# Patient Record
Sex: MALE | Race: WHITE | Employment: FULL TIME | URBAN - NONMETROPOLITAN AREA
[De-identification: names, ages, dates, MRNs, and addresses within clinical notes are randomized per-mention and may not be internally consistent; named-entity substitution may affect disease eponyms.]

---

## 2018-12-17 ENCOUNTER — APPOINTMENT (OUTPATIENT)
Dept: GENERAL RADIOLOGY | Age: 28
End: 2018-12-17
Payer: OTHER MISCELLANEOUS

## 2018-12-17 ENCOUNTER — HOSPITAL ENCOUNTER (EMERGENCY)
Age: 28
Discharge: HOME | End: 2018-12-17
Attending: EMERGENCY MEDICINE
Payer: OTHER MISCELLANEOUS

## 2018-12-17 VITALS
RESPIRATION RATE: 18 BRPM | HEART RATE: 93 BPM | OXYGEN SATURATION: 100 % | BODY MASS INDEX: 20.76 KG/M2 | WEIGHT: 145 LBS | HEIGHT: 70 IN | SYSTOLIC BLOOD PRESSURE: 140 MMHG | TEMPERATURE: 97.9 F | DIASTOLIC BLOOD PRESSURE: 77 MMHG

## 2018-12-17 DIAGNOSIS — S80.11XA CONTUSION OF MULTIPLE SITES OF RIGHT LOWER EXTREMITY, INITIAL ENCOUNTER: ICD-10-CM

## 2018-12-17 DIAGNOSIS — S01.21XA LACERATION OF NOSE, INITIAL ENCOUNTER: ICD-10-CM

## 2018-12-17 DIAGNOSIS — S00.83XA CONTUSION OF FACE, INITIAL ENCOUNTER: ICD-10-CM
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
## 2018-12-17 PROCEDURE — 99284 EMERGENCY DEPT VISIT MOD MDM: CPT

## 2018-12-17 PROCEDURE — 90471 IMMUNIZATION ADMIN: CPT | Performed by: EMERGENCY MEDICINE

## 2018-12-17 PROCEDURE — 6360000002 HC RX W HCPCS: Performed by: EMERGENCY MEDICINE

## 2018-12-17 PROCEDURE — 71046 X-RAY EXAM CHEST 2 VIEWS: CPT

## 2018-12-17 PROCEDURE — 90714 TD VACC NO PRESV 7 YRS+ IM: CPT | Performed by: EMERGENCY MEDICINE

## 2018-12-17 RX ORDER — TETANUS AND DIPHTHERIA TOXOIDS ADSORBED 2; 2 [LF]/.5ML; [LF]/.5ML
0.5 INJECTION INTRAMUSCULAR ONCE
Status: COMPLETED | OUTPATIENT
Start: 2018-12-17 | End: 2018-12-17

## 2018-12-17 RX ADMIN — TETANUS AND DIPHTHERIA TOXOIDS ADSORBED 0.5 ML: 2; 2 INJECTION INTRAMUSCULAR at 08:51

## 2018-12-17 ASSESSMENT — PAIN DESCRIPTION - LOCATION: LOCATION: FACE

## 2018-12-17 ASSESSMENT — ENCOUNTER SYMPTOMS
BACK PAIN: 0
GASTROINTESTINAL NEGATIVE: 1
RESPIRATORY NEGATIVE: 1

## 2018-12-17 ASSESSMENT — PAIN SCALES - GENERAL: PAINLEVEL_OUTOF10: 7

## 2018-12-17 ASSESSMENT — PAIN DESCRIPTION - PAIN TYPE: TYPE: ACUTE PAIN

## 2018-12-17 ASSESSMENT — PAIN DESCRIPTION - DESCRIPTORS: DESCRIPTORS: ACHING

## 2018-12-17 ASSESSMENT — PAIN DESCRIPTION - FREQUENCY: FREQUENCY: CONTINUOUS
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
## 2018-12-17 NOTE — ED NOTES
Discharge instructions were reviewed and the patient was encouraged to follow up with Td Olvera. He was also encouraged to take tylenol or Ibuprofen for discomfort.       Nina Kidd RN  12/17/18 [id number]

## 2020-12-29 ENCOUNTER — HOSPITAL ENCOUNTER (OUTPATIENT)
Dept: PSYCHIATRY | Age: 30
Setting detail: THERAPIES SERIES
Discharge: HOME | End: 2020-12-29

## 2020-12-29 PROCEDURE — 90791 PSYCH DIAGNOSTIC EVALUATION: CPT

## 2020-12-29 PROCEDURE — 80305 DRUG TEST PRSMV DIR OPT OBS: CPT

## 2020-12-29 ASSESSMENT — LIFESTYLE VARIABLES: HISTORY_ALCOHOL_USE: NO

## 2020-12-29 NOTE — PROGRESS NOTES
[address] [place] [address] Urinalysis Laboratory Testing and Medical History      Location: [] [place] [x] Earline Mak MD., [id number], Medical Director of [facility] Director orders for [address] [place] [address] clinical therapists to collect an urine sample from:    Client: Beti Martinez   : 1990   Case# [id number]    Urine sample will be collected following the collections guidelines provided on Clinical Reference Laboratory [facility] [place]) custody form, and completion of the [facility] Non-Federal chain of custody drug screening form. During the course of treatment, randomly a urine sample will be collected, at a minimum of one time a month, more frequently as needed, as part of the clinical outpatient alcohol and drug treatment program at [address] [place] [address]. Medical care recommendation for clients experiencing/reporting  medical concerns, who do not have a family physician and willing to attend  medical care will be assisted in seeking medical care. Clinical providers will refer clients to local family physicians practices as part of the  clients treatment plan and assist the client in gaining access to an appointment. Release of information will be requested to support the  clients seeking medical care. Summary of Medical History  Prior to Admission medications    Not on File     Past Surgical History:   Procedure Laterality Date    FRACTURE SURGERY       History reviewed. No pertinent past medical history. There are no active problems to display for this patient.       Jina Tillman [facility]  2020/11:02 AM
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
## 2020-12-29 NOTE — PROGRESS NOTES
[facility]                Progress Note    [] [place]  [] Vicki mireles                    Patient Name: Rasta Ruano   : 1990     Case # :  Jocelyn Schmitt  Therapist: Tom Shell        Objective/Service/Time:    K1.0 UDS . 35 Asmt. S- Client early for his [de-identified], Client referral from Randy Allison. Client reported legal and use history. Client has 2 boys ages 3 and 5 special medical issues. O- Client is focused, cooperative, oriented x 3, No S/I, No H/I, No MH  A- Completed the Asmt, the screen and TRUMAN  P- Treatment Planning next week.                   Tom Shell MA, BEAR, LSW, MAC 20, 11:54 AM

## 2021-01-05 ENCOUNTER — HOSPITAL ENCOUNTER (OUTPATIENT)
Dept: PSYCHIATRY | Age: 31
Setting detail: THERAPIES SERIES
Discharge: HOME | End: 2021-01-05

## 2021-01-05 PROCEDURE — 90834 PSYTX W PT 45 MINUTES: CPT

## 2021-01-05 NOTE — PROGRESS NOTES
[facility] TREATMENT PLAN      Location: [] [place] [x] Vicki mireles    Treatment plan: Initial    Strengths: Dedication, enjoys work    Weakness/Limitations: Use of drugs, legal    Service/Frequency/Duration: 1GT, 1IT and random UDS all in 90 days    Diagnosis: F12.10 Nondependent cannabis abuse-unspecified use    Level of Care: 1 Outpatient Services    1. Problem: Use of drugs   a. Goal: Maintain sober living in 90 days  b. Objectives:   i. 1) Complete RP treatment handouts on Cannabis Abuse in 90 days Evaluation Date: 4/05/21 Code: C Continue TBD  ii. 2) Id 2 triggers of use in 90 days  Evaluation Date: 4/05/21 Code: C Continue TBD      2. Problem: Stressors that lead to use  a. Goal: Coping plan for family and overall stress in 90 days  b. Objectives:   i. 1) Learn 7 habits of highly effective people in 90 days Evaluation Date:4/05/21 Code: C Continue TBD   ii. 2) Find supports such as, [address] or peer activity in 90 days Evaluation Date: 4/05/21 Code: C Continue TBD       3. Problem: Legal   a. Goal: Meet expectations of [employer] in 90 days  b. Objectives:   i. 1) Attend all meetings and pay sanctions in 90 daysEvaluation Date: 4/05/21 Code: C Continue TBD       Defer: Exploring careers    Discharge Plan/Instructions: Client plans to maintain sober living and support his growing family with a stable position. Brody Sheffield / 1990 has participated in the treatment plan development outlined above on 1/5/2021.      Deangelo Blanco  1/5/2021/9:14 AM

## 2021-01-05 NOTE — PROGRESS NOTES
[facility]                Progress Note    [] [place]  [x] Marce Martinez                    Patient Name: Elías Hernandez   : 1990     Case # : [id number]  Therapist: Forest Koehler        Objective/Service/Time:    Ind Counseling, K1.0,  Treatment Planning  S- Client was on time, focused. Client supporting his son's and helping with home schooling. Laid off from asphalt job. O- Focused, awareness and motivated  A- Discussion on treatment planning and discussed UDS. Client and this writer discussed his addiction to cig's and his social use of cannabis to calm his nerves. P- IT<GT,  Woodworking, taking care of his children.                   Forest Koehler MA, BEAR, LSW, MAC 21, 11:56 AM

## 2021-01-12 ENCOUNTER — HOSPITAL ENCOUNTER (OUTPATIENT)
Dept: PSYCHIATRY | Age: 31
Setting detail: THERAPIES SERIES
Discharge: HOME | End: 2021-01-12

## 2021-01-12 NOTE — PROGRESS NOTES
[facility]                Progress Note    [] [place]  [x] Catarina Castillo                    Patient Name: Leni Bethea   : 1990     Case # : [id number]  Therapist: Krystal Curry        Objective/Service/Time:    Case Management, Client was offered Telehealth due to his cancellation. Client stated he could not because he had some things going on. Client agreed to attend group on Thursday.                 Krystal Curry, MA, BEAR, LSW, MAC 21, 10:32 AM

## 2021-01-14 ENCOUNTER — HOSPITAL ENCOUNTER (OUTPATIENT)
Dept: PSYCHIATRY | Age: 31
Setting detail: THERAPIES SERIES
Discharge: HOME | End: 2021-01-14

## 2021-01-14 PROCEDURE — 90853 GROUP PSYCHOTHERAPY: CPT

## 2021-01-14 NOTE — GROUP NOTE
[facility] Group Therapy Note      1/14/2021    Location:  [employer]    Clients Presents: [phone]    Clients Absent:     Length of session: 1.5 hours    Group Note: OP    Group Type: Co-Ed    New members were welcomed and introduced. Norms and expectations of group were discussed. Content: Check in, Discussion on the phases of recovery and client's related to their recovery from drugs and alcohol. [facility]  1/14/2021 11:27 AM    Co-Therapist: N/A      [facility] Individual Group Progress Note    Paul Sinha  1990 1/14/2021    Notes on Client Progress in Group    Client's initial group, Client disclosed of use and his family stressors and blessings.      Reese Cooks, [facility]  1/14/2021 11:29 AM    Co-Therapist: N/A

## 2021-01-19 ENCOUNTER — HOSPITAL ENCOUNTER (OUTPATIENT)
Dept: PSYCHIATRY | Age: 31
Setting detail: THERAPIES SERIES
Discharge: HOME | End: 2021-01-19

## 2021-01-19 PROCEDURE — 90834 PSYTX W PT 45 MINUTES: CPT

## 2021-01-19 NOTE — PROGRESS NOTES
[facility]                Progress Note    [] [place]  [x] [facility]                    Patient Name: Kaci Kaminski   : 1990     Case # :  [id number]  Therapist: Melina Rodriguez        Objective/Service/Time:    K-    , Ind Counseling, Topic: Begin Treatment Booklet/Discussion goal   S- Client reports, doing ok, supporting children home school and home. Client waiting for spring begin work again  [employer]- client cooperative, sober, denies use or issue, concerned about the boys  A- Discussed pages of txt book 2-7, Roller coaster ride and consequences of cravings discussed.   P- GT<IT, [facility] and PO                Melina Rodriguez, [place], [place], [place], [facility] 21, 10:20 AM

## 2021-01-21 ENCOUNTER — HOSPITAL ENCOUNTER (OUTPATIENT)
Dept: PSYCHIATRY | Age: 31
Setting detail: THERAPIES SERIES
Discharge: HOME | End: 2021-01-21

## 2021-01-21 PROCEDURE — 90853 GROUP PSYCHOTHERAPY: CPT

## 2021-01-21 NOTE — GROUP NOTE
[facility] Group Therapy Note      1/21/2021    Location:  [employer]    Clients Presents: [phone]    Clients Absent:     Length of session: 1.5 hours    Group Note: OP    Group Type: Co-Ed    New members were welcomed and introduced. Norms and expectations of group were discussed. Content: Client's checked in; Client's discussed Protective coping factors, Clients discussed change/defense mechanism after watching DVD Who Moved My Cheese. Deangelo Khan  1/21/2021 12:25 PM    Co-Therapist: N/A      [facility] Individual Group Progress Note    Elivar  1990 1/21/2021    Notes on Client Progress in Group    Client denies use and responded to protective factors by sharing about his disabled son and how positive/blessed his family is.      Deangelo Khan  1/21/2021 12:27 PM    Co-Therapist: N/A

## 2021-01-26 ENCOUNTER — HOSPITAL ENCOUNTER (OUTPATIENT)
Dept: PSYCHIATRY | Age: 31
Setting detail: THERAPIES SERIES
Discharge: HOME | End: 2021-01-26

## 2021-01-26 PROCEDURE — 90834 PSYTX W PT 45 MINUTES: CPT

## 2021-01-26 PROCEDURE — 80305 DRUG TEST PRSMV DIR OPT OBS: CPT

## 2021-01-26 NOTE — PROGRESS NOTES
[facility]                Progress Note    [] [place]  [x] [facility]                    Patient Name: Kaci Kaminski   : 1990     Case # :  [id number]  Therapist: Melina Rodriguez        Objective/Service/Time:    K 1.0 UDS . 35 Topic: Impact of alcohol on your brain, liver and heart. S-Client continues to do well and is motivated by his children. Client working on his car and denies other challenges currently. Per his report  O- Client is focused, cooperative, full range- bright  A- Discussed of impact of alcohol on your brain, liver and heart. P-GRP, [facility], IT,  Homeschooling/family focused.                   Melina Rodriguez MA, LPC, LSW, MAC 21, 10:41 AM

## 2021-01-28 ENCOUNTER — HOSPITAL ENCOUNTER (OUTPATIENT)
Dept: PSYCHIATRY | Age: 31
Setting detail: THERAPIES SERIES
Discharge: HOME | End: 2021-01-28

## 2021-01-28 PROCEDURE — 90853 GROUP PSYCHOTHERAPY: CPT

## 2021-02-02 ENCOUNTER — HOSPITAL ENCOUNTER (OUTPATIENT)
Dept: PSYCHIATRY | Age: 31
Setting detail: THERAPIES SERIES
Discharge: HOME | End: 2021-02-02

## 2021-02-02 PROCEDURE — 90834 PSYTX W PT 45 MINUTES: CPT

## 2021-02-02 NOTE — PROGRESS NOTES
[facility]                Progress Note    [] [place]  [x] Vicki mireles                    Patient Name: Carmen Nazario   : 1990     Case # :  [id number]  Therapist: Awilda Garza        Objective/Service/Time:    K1.0 Ind Counseling,  Sober goal , discuss UDS Results  S-Client reports, drinks mountain dew and his diet could be better. Client denies use, client supporting family during his lay off, spouse working more as he takes care of their children. O- Discussed UDS, reports no use or major issues currently  A- Reviewed and discussed impact of meth, alcohol, marijuana and cocaine on ones Brain, heart, lungs and reproductive system. P- PO visit in 2 weeks, increase activity, increase water intake, apply information, look at healthy diet, GT on .                   Awilda Garza MA, LPC, LSW, MAC 21, 10:48 AM

## 2021-02-04 ENCOUNTER — APPOINTMENT (OUTPATIENT)
Dept: PSYCHIATRY | Age: 31
End: 2021-02-04

## 2021-02-09 ENCOUNTER — HOSPITAL ENCOUNTER (OUTPATIENT)
Dept: PSYCHIATRY | Age: 31
Setting detail: THERAPIES SERIES
Discharge: HOME | End: 2021-02-09

## 2021-02-09 PROCEDURE — 90834 PSYTX W PT 45 MINUTES: CPT

## 2021-02-09 NOTE — PROGRESS NOTES
612 [facility]                Progress Note    [] [place]  [x] Steve Sandoval                    Patient Name: Celso Ch   : 1990     Case # : [id number]  Therapist: Joel Mensah        Objective/Service/Time:    K1.0    Ind Counseling, Topic   S- Client reports, anxious to be called back to work. Client's family being blessed with a vacation to [facility] in July due to his son's special needs. Client reports his son is excited, and client fixed his car. O- Client full range, cooperative and denies current use  A- Client and this writer discussed impact of using Narcotics,  Inhalants, and client cited examples of people he knows who has abused drugs. Client discussed financial stressors and is working with financial aid for treatment cost.  P- GT and IT, working with medical providers for his sons.                   Joel Mensah MA, LPC, LSW, MAC 21, 10:50 AM

## 2021-02-11 ENCOUNTER — HOSPITAL ENCOUNTER (OUTPATIENT)
Dept: PSYCHIATRY | Age: 31
Setting detail: THERAPIES SERIES
Discharge: HOME | End: 2021-02-11

## 2021-02-11 PROCEDURE — 90853 GROUP PSYCHOTHERAPY: CPT

## 2021-02-11 NOTE — GROUP NOTE
[facility] Group Therapy Note      2/11/2021    Location:  Allyson Mac    Clients YBPRBHAC:[phone]     Clients Absent: [phone]    Length of session: 1.5 hours    Group Note: OP    Group Type: Co-Ed    New members were welcomed and introduced. Norms and expectations of group were discussed. Content: Client's discussed spirituality and Father Emir Siegel Step 1 on Powerlessness. Client affirmed a client who was completing GT. Melina Rodriguez [facility]  2/11/2021 11:56 AM    Co-Therapist: N/A      [facility] Individual Group Progress Note    Kaci Kaminski  1990 2/11/2021    Notes on Client Progress in Group    Client gave feedback to his peer, and responded to the Father Emir Siegel tape. Client shared a lot about his special needs son who is a blessing to him. Denies use.     Melina Rodriguez [facility]  2/11/2021 11:59 AM    Co-Therapist: N/A

## 2021-02-18 ENCOUNTER — HOSPITAL ENCOUNTER (OUTPATIENT)
Dept: PSYCHIATRY | Age: 31
Setting detail: THERAPIES SERIES
Discharge: HOME | End: 2021-02-18

## 2021-02-18 PROCEDURE — 90834 PSYTX W PT 45 MINUTES: CPT

## 2021-02-18 NOTE — PROGRESS NOTES
[facility]                Progress Note    [] [place]  [x] Vicki mireles                    Patient Name: Agusto Saavedra   : 1990     Case # :  [id number]  Therapist: Rhett Escalante        Objective/Service/Time:    Ind Counseling K1.0,  Topic:  impact of LSD, Prescription drugs and synthetics  S- Client came into [facility], only attender so agreed to IT. 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 Client enjoys working with his hands. Client met with his PO 3/1  O- focused, cooperative, engaged  A- Discussed his past use and his non use stress reduction, discussed treatment booklet impact of drugs on users. P- IT, non use stress reduction, developing positive habits, GT.                   Rhett Escalante MA, BEAR, LSW, MAC 21, 11:18 AM

## 2021-02-23 ENCOUNTER — HOSPITAL ENCOUNTER (OUTPATIENT)
Dept: PSYCHIATRY | Age: 31
Setting detail: THERAPIES SERIES
Discharge: HOME | End: 2021-02-23

## 2021-02-23 PROCEDURE — 90834 PSYTX W PT 45 MINUTES: CPT

## 2021-02-23 PROCEDURE — 80305 DRUG TEST PRSMV DIR OPT OBS: CPT

## 2021-02-25 ENCOUNTER — APPOINTMENT (OUTPATIENT)
Dept: PSYCHIATRY | Age: 31
End: 2021-02-25

## 2021-03-02 ENCOUNTER — HOSPITAL ENCOUNTER (OUTPATIENT)
Dept: PSYCHIATRY | Age: 31
Setting detail: THERAPIES SERIES
Discharge: HOME | End: 2021-03-02

## 2021-03-02 PROCEDURE — 90834 PSYTX W PT 45 MINUTES: CPT

## 2021-03-02 NOTE — PROGRESS NOTES
[facility] TREATMENT PLAN      Location: [] [place] [x] Vicki mireles    Treatment plan: Initial    Strengths: Dedication, enjoys work    Weakness/Limitations: Use of drugs, legal    Service/Frequency/Duration: 1GT, 1IT and random UDS all in 90 days    Diagnosis: F12.10 Nondependent cannabis abuse-unspecified use    Level of Care: 1 Outpatient Services    1. Problem: Use of drugs   a. Goal: Maintain sober living in 90 days  b. Objectives:   i. 1) Complete RP treatment handouts on Cannabis Abuse in 90 days Evaluation Date: 4/05/21 Code: C Continue TBD 2/23/21  ii. 2) Id 2 triggers of use in 90 days  Evaluation Date: 4/05/21 Code: C Continue TBD 2/23/21      2. Problem: Stressors that lead to use  a. Goal: Coping plan for family and overall stress in 90 days  b. Objectives:   i. 1) Learn 7 habits of highly effective people in 90 days Evaluation Date:4/05/21 Code: C Continue TBD 3/2/21  ii. 2) Find supports such as, [address] or peer activity in 90 days Evaluation Date: 4/05/21 Code: C Continue TBD       3. Problem: Legal   a. Goal: Meet expectations of [employer] in 90 days  b. Objectives:   i. 1) Attend all meetings and pay sanctions in 90 daysEvaluation Date: 4/05/21 Code: C Continue TBD monthly meetings 3/2/21       Defer: Exploring careers    Discharge Plan/Instructions: Client plans to maintain sober living and support his growing family with a stable position. Susanne Joseph / 1990 has participated in the treatment plan development outlined above on 3/2/2021.      Michael Real [facility]  3/2/2021/10:49 AM

## 2021-03-02 NOTE — PROGRESS NOTES
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 Client continues to support his children and family. O- Client denies use, signed his UDS, cooperative, shared freely. A- Client and this writer discussed treatment goals and focused on 7 Habits of Highly effective Person. Client shared and related to the habits. Client assessment of self he needs to work on being proactive and not procrastinate. P- GT, apply skills, completed treatment by the end of the month.                   Wing Mccallum MA, BEAR, LSW, [facility] 21, 11:52 AM

## 2021-03-02 NOTE — PROGRESS NOTES
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 O- Client denies use, signed his UDS, cooperative, shared freely. A- Client and this writer discussed treatment goals and focused on 7 Habits of Highly effective Person. Client shared and related to the habits. Client assessment of self he needs to work on being proactive and not procrastinate. P- GT, apply skills, completed treatment by the end of the month.                   Emmanuel Null MA, BEAR, LSW, MAC 21, 1:21 PM

## 2021-03-04 ENCOUNTER — HOSPITAL ENCOUNTER (OUTPATIENT)
Dept: PSYCHIATRY | Age: 31
Setting detail: THERAPIES SERIES
Discharge: HOME | End: 2021-03-04

## 2021-03-04 PROCEDURE — 90853 GROUP PSYCHOTHERAPY: CPT

## 2021-03-04 NOTE — GROUP NOTE
[address] [facility] Group Therapy Note      3/4/2021    Location:  [employer]    Clients Presents: [phone]    Clients Absent:     Length of session: 1.5 hours    Group Note: OP    Group Type: Co-Ed    New members were welcomed and introduced. Norms and expectations of group were discussed. Content: Client's discussed benefits/consequences of their addiction, the losses of their addiction and voids/positive alternatives of their addiction. Discussed Rosalio Aguilar addiction story. Melina Rodriguez [facility]  3/4/2021 11:29 AM    Co-Therapist: N/A      [facility] Individual Group Progress Note    Kaci Kaminski  1990  3/4/2021    Notes on Client Progress in Group    Client could relate to Cesar Bassett, and stated he is anti pain meds, talk freely about Return on invested.  Client is focused and motivated     Melina Rodriguez [facility]  3/4/2021 11:34 AM    Co-Therapist: N/A

## 2021-03-09 ENCOUNTER — APPOINTMENT (OUTPATIENT)
Dept: PSYCHIATRY | Age: 31
End: 2021-03-09

## 2021-03-10 ENCOUNTER — HOSPITAL ENCOUNTER (OUTPATIENT)
Dept: PSYCHIATRY | Age: 31
Setting detail: THERAPIES SERIES
Discharge: HOME | End: 2021-03-10

## 2021-03-10 PROCEDURE — 90834 PSYTX W PT 45 MINUTES: CPT

## 2021-03-10 ASSESSMENT — ANXIETY QUESTIONNAIRES
5. BEING SO RESTLESS THAT IT IS HARD TO SIT STILL: 1-SEVERAL DAYS
GAD7 TOTAL SCORE: 1
6. BECOMING EASILY ANNOYED OR IRRITABLE: 0-NOT AT ALL
3. WORRYING TOO MUCH ABOUT DIFFERENT THINGS: 0-NOT AT ALL
7. FEELING AFRAID AS IF SOMETHING AWFUL MIGHT HAPPEN: 0-NOT AT ALL

## 2021-03-10 NOTE — PROGRESS NOTES
[facility] TREATMENT PLAN      Location: [] [place] [x] Vicki mireles    Treatment plan: Initial    Strengths: Dedication, enjoys work    Weakness/Limitations: Use of drugs, legal    Service/Frequency/Duration: 1GT, 1IT and random UDS all in 90 days    Diagnosis: F12.10 Nondependent cannabis abuse-unspecified use    Level of Care: 1 Outpatient Services    1. Problem: Use of drugs   a. Goal: Maintain sober living in 90 days  b. Objectives:   i. 1) Complete RP treatment handouts on Cannabis Abuse in 90 days Evaluation Date: 4/05/21 Code: C Continue TBD 2/23/21completed Reviewed 3/10/21  ii. 2) Id 2 triggers of use in 90 days  Evaluation Date: 4/05/21 Code: C Continue TBD 2/23/21 completed Reviewed 3/10/21      2. Problem: Stressors that lead to use  a. Goal: Coping plan for family and overall stress in 90 days  b. Objectives:   i. 1) Learn 7 habits of highly effective people in 90 days Evaluation Date:4/05/21 Code: C Continue TBD 3/2/21 Completed Reviewed 3/10/21  ii. 2) Find supports such as, [address] or peer activity in 90 days Evaluation Date: 4/05/21 Code: C Continue TBD  Reviewed 3/10/21      3. Problem: Legal   a. Goal: Meet expectations of [facility]. [address] in 90 days  b. Objectives:   i. 1) Attend all meetings and pay sanctions in 90 daysEvaluation Date: 4/05/21 Code: C Continue TBD monthly meetings 3/2/21, 3/15/21 next meeting       Defer: Exploring careers    Discharge Plan/Instructions: Client plans to maintain sober living and support his growing family with a stable position. Charlene Herr / 1990 has participated in the treatment plan development outlined above on 3/10/2021.      Emmanuel Null LPC  3/10/2021/10:43 AM

## 2021-03-10 NOTE — PROGRESS NOTES
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 Client denies any issues with mood, motivation use or anxiety. O- Dedicated to his family, full range, Client views self as logical, and enjoys affirmation of words  A- Client and this writer reviewed the goals, and Assessment tentative DC . Client denies use, admits ready to work outside of the house. Client and this writer discussed 5 Languages of Love and Temperament from [place], helps coping with relationships. P- PO visit next week, apply skills and coping.                   Marco Montano MA, BEAR, LSW, MAC 03/10/21, 11:43 AM

## 2021-03-11 ENCOUNTER — HOSPITAL ENCOUNTER (OUTPATIENT)
Dept: PSYCHIATRY | Age: 31
Setting detail: THERAPIES SERIES
Discharge: HOME | End: 2021-03-11

## 2021-03-11 PROCEDURE — 90853 GROUP PSYCHOTHERAPY: CPT

## 2021-03-11 NOTE — GROUP NOTE
[address] [facility] Group Therapy Note      3/11/2021    Location:  [facility]    Clients Presents: [phone]    Clients Absent: [id number]    Length of session: 1.5 hours    Group Note: OP    Group Type: Co-Ed    New members were welcomed and introduced. Norms and expectations of group were discussed. Content: Client's discussed Relapse maintenance and focusing on the positives of sober living, discussed NABILA Tierney's [place] of Influence and Addiction discussion Q/A. Rafa Wellington [facility] [place]  3/11/2021 11:29 AM    Co-Therapist: N/A      [facility] Individual Group Progress Note    Nieves Emile  1990  3/11/2021    Notes on Client Progress in Group    Client complete GRAFF Motivation Ruler, discussed his passions and how he generally focuses on solutions and not concerns. Client shares his disabled son has been a role model to him. Client has a passion for building and it is a motivational factor.      Rafa Wellington [facility] [place]  3/11/2021 11:34 AM    Co-Therapist: N/A

## 2021-03-16 ENCOUNTER — HOSPITAL ENCOUNTER (OUTPATIENT)
Dept: PSYCHIATRY | Age: 31
Setting detail: THERAPIES SERIES
Discharge: HOME | End: 2021-03-16

## 2021-03-16 PROCEDURE — 80305 DRUG TEST PRSMV DIR OPT OBS: CPT

## 2021-03-16 PROCEDURE — 90834 PSYTX W PT 45 MINUTES: CPT

## 2021-03-18 ENCOUNTER — HOSPITAL ENCOUNTER (OUTPATIENT)
Dept: PSYCHIATRY | Age: 31
Setting detail: THERAPIES SERIES
Discharge: HOME | End: 2021-03-18

## 2021-03-18 PROCEDURE — 90853 GROUP PSYCHOTHERAPY: CPT

## 2021-03-18 NOTE — GROUP NOTE
612 [facility] Group Therapy Note      3/18/2021    Location:  [facility]    Clients Presents: [phone]    Clients Absent:     Length of session: 1.5 hours    Group Note: OP    Group Type: Co-Ed    New members were welcomed and introduced. Norms and expectations of group were discussed. Content: Client's discussed their Relapse Maintenance, Viewing and discussing a sober story on Neal Dailey Drinking impact on Neurotransmitters, and Reading and evaluating [facility] AT [place] in the [employer]"    [facility] [place] [place]  3/18/2021 12:26 PM    Co-Therapist: N/A      [facility] Individual Group Progress Note    Karoline Appiah  1990  3/18/2021    Notes on Client Progress in Group    Client relayed sober living and how he used marijuana to change his GURPREET (anti anxiety) chemical, now he uses activities with his sons such as building [employer].     [facility] [place]  3/18/2021 12:31 PM    Co-Therapist: N/A

## 2021-03-23 ENCOUNTER — HOSPITAL ENCOUNTER (OUTPATIENT)
Dept: PSYCHIATRY | Age: 31
Setting detail: THERAPIES SERIES
Discharge: HOME | End: 2021-03-23

## 2021-03-23 PROCEDURE — 90834 PSYTX W PT 45 MINUTES: CPT

## 2021-03-23 NOTE — PROGRESS NOTES
612 [facility]                Progress Note    [] Cristian  [x] Vicki mireles                    Patient Name: Alma Rosa Kelsey   : 1990     Case # :  [id number]  Therapist: Mackenzie Espinosa        Objective/Service/Time:    K1.0 Ind Counseling, Topic:  Coping with stressors, planning for vacations  S-Client completed Elta Shaquille, discussed his vacation, and discussed his plan to return to work. Client continues to support his family and son's medical issues. O- Client and this writer reviewed UDS and discussed DC plan next week, denies use or any challenges  A- Client focuses on the positives and is enabled by his oldest son. Client and this writer discussed [facility] with his boys, and PT work for his Step Mom. Client has 1 GT and IT remaining.                 Mackenzie Espinosa MA, BEAR, LSW, MAC 21, 11:00 AM

## 2021-03-23 NOTE — PROGRESS NOTES
[facility] TREATMENT PLAN      Location: [] [place] [x] Vicki mireles    Treatment plan: Initial    Strengths: Dedication, enjoys work    Weakness/Limitations: Use of drugs, legal    Service/Frequency/Duration: 1GT, 1IT and random UDS all in 90 days    Diagnosis: F12.10 Nondependent cannabis abuse-unspecified use    Level of Care: 1 Outpatient Services    1. Problem: Use of drugs   a. Goal: Maintain sober living in 90 days  b. Objectives:   i. 1) Complete RP treatment handouts on Cannabis Abuse in 90 days Evaluation Date: 4/05/21 Code: C Continue TBD 2/23/21completed Reviewed 3/10/21 Achieved, 3/23/21  ii. 2) Id 2 triggers of use in 90 days  Evaluation Date: 4/05/21 Code: C Continue TBD 2/23/21 completed Reviewed 3/10/21, Achieved, 3/23/21      2. Problem: Stressors that lead to use  a. Goal: Coping plan for family and overall stress in 90 days  b. Objectives:   i. 1) Learn 7 habits of highly effective people in 90 days Evaluation Date:4/05/21 Code: C Continue TBD 3/2/21 Completed Reviewed 3/10/21 Achieved 3/23/21  ii. 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 Problem: Legal   a. Goal: Meet expectations of [facility] in 90 days  b. Objectives:   i. 1) Attend all meetings and pay sanctions in 90 daysEvaluation Date: 4/05/21 Code: C Continue TBD monthly meetings 3/2/21, 3/15/21 next meeting Achieved 3/23/21       Defer: Exploring careers    Discharge Plan/Instructions: Client plans to maintain sober living and support his growing family with a stable position. Carmen Nazario / 1990 has participated in the treatment plan development outlined above on 3/23/2021.      Awilda Garza [facility]  3/23/2021/1:36 PM

## 2021-03-25 ENCOUNTER — HOSPITAL ENCOUNTER (OUTPATIENT)
Dept: PSYCHIATRY | Age: 31
Setting detail: THERAPIES SERIES
Discharge: HOME | End: 2021-03-25

## 2021-03-25 PROCEDURE — 90853 GROUP PSYCHOTHERAPY: CPT

## 2021-03-25 NOTE — GROUP NOTE
612 [facility] Group Therapy Note      3/25/2021    Location:  [facility]    Clients Presents: [phone]     Clients Absent: [phone]    Length of session: 1.5 hours    Group Note: OP    Group Type: Co-Ed    New members were welcomed and introduced. Norms and expectations of group were discussed. Content: Client's discussed relapse maintenance and motivation, discussed Maureen Barbour, and utilize the [facility] of yourself. [facility] [place], [place]  3/25/2021 11:42 AM    Co-Therapist: N/A      [facility] Group Progress Note    Andie Todd  1990  3/25/2021    Notes on Client Progress in Group    Client has eye irritation. Client is excited to complete GRP and treatment & accepted feedback. Client responded to the video and to the STOPP handout. Client denies use and is going on a family vacation.     [facility] [place], [place]  3/25/2021 11:46 AM    Co-Therapist: N/A

## 2021-03-30 ENCOUNTER — HOSPITAL ENCOUNTER (OUTPATIENT)
Dept: PSYCHIATRY | Age: 31
Setting detail: THERAPIES SERIES
Discharge: HOME | End: 2021-03-30

## 2021-03-30 PROCEDURE — 90834 PSYTX W PT 45 MINUTES: CPT

## 2021-03-30 NOTE — PROGRESS NOTES
612 [facility] Discharge Treatment Plan    Thuan Armenta  1990  Case # [id number]    Location: [] [place] [x] Vicki mireles    A. Stresses that I need to monitor  1. family  2. relationship  3. work      B. Major triggers to using alcohol/drugs   1. Stress        Sober Plan   1. Work  2. Apply tools that your learned  3. Family activities    C. Sobriety Support   1. Friend:Dilip   2. Treatment staff: [address] [place] [address]   3. Family member: [facility], Mother,  Tiffanie Dobbins  4. AA/NA recovery program, sponsor, meetings day/times, daily ready: [employer]    D. Non-using activities  1. Building legos  2. Excercise  3. Wilma Slight working    E. Consequences of Drug/Alcohol use  1. Probation Violation        G. Short term goals to achieve  1. Family Vacation March, and July  2. Return to work, [employer]. Follow up recommendations  1. Use your reports  2. Call in [address] / 1990 has participated in the discharge treatment plan development outlined above on 3/30/2021.      Wing Mccallum [facility] [place]  3/30/2021/8:01 AM

## 2021-03-30 NOTE — PROGRESS NOTES
612 [facility]                Progress Note    [] Macy  [x] Chung Age                    Patient Name: Dejon Oviedo   : 1990     Case # :  [id number]  Therapist: Balbina Ramos        Objective/Service/Time:    Ind Counseling 1, K1.0 Discharge Planning  S- Client completing treatment, Client anxious to go on his family vacation in March and July. Client only stressor is not receiving unemployment. Client returns to work in April. O- Client cooperative, excited, aware of stressors, denies use  A-Client discussed stressors,  And his upcoming vacation. Client completed survey and DC Plan. P- Follow up with probation and follow DC plan.                   Balbina Ramos MA, BEAR, LSW, [facility] 21, 8:10 AM

## 2022-02-08 ENCOUNTER — APPOINTMENT (OUTPATIENT)
Dept: GENERAL RADIOLOGY | Age: 32
End: 2022-02-08

## 2022-02-08 ENCOUNTER — HOSPITAL ENCOUNTER (EMERGENCY)
Age: 32
Discharge: HOME | End: 2022-02-08
Attending: EMERGENCY MEDICINE

## 2022-02-08 VITALS
TEMPERATURE: 97.9 F | BODY MASS INDEX: 20.81 KG/M2 | RESPIRATION RATE: 25 BRPM | SYSTOLIC BLOOD PRESSURE: 120 MMHG | HEART RATE: 80 BPM | OXYGEN SATURATION: 99 % | DIASTOLIC BLOOD PRESSURE: 80 MMHG | WEIGHT: 145 LBS

## 2022-02-08 DIAGNOSIS — R07.89 OTHER CHEST PAIN: ICD-10-CM

## 2022-02-08 DIAGNOSIS — R09.1 PLEURISY: Primary | ICD-10-CM

## 2022-02-08 LAB
ANION GAP SERPL CALCULATED.3IONS-SCNC: 10 MMOL/L (ref 4–16)
BASOPHILS ABSOLUTE: 0 K/CU MM
BASOPHILS RELATIVE PERCENT: 0.1 % (ref 0–1)
BUN BLDV-MCNC: 15 MG/DL (ref 6–23)
CALCIUM SERPL-MCNC: 9 MG/DL (ref 8.3–10.6)
CHLORIDE BLD-SCNC: 105 MMOL/L (ref 99–110)
CO2: 26 MMOL/L (ref 21–32)
CREAT SERPL-MCNC: 0.9 MG/DL (ref 0.9–1.3)
D DIMER: 146 NG/ML(DDU)
DIFFERENTIAL TYPE: ABNORMAL
EOSINOPHILS ABSOLUTE: 0.1 K/CU MM
EOSINOPHILS RELATIVE PERCENT: 0.5 % (ref 0–3)
GFR AFRICAN AMERICAN: >60 ML/MIN/1.73M2
GFR NON-AFRICAN AMERICAN: >60 ML/MIN/1.73M2
GLUCOSE BLD-MCNC: 94 MG/DL (ref 70–99)
HCT VFR BLD CALC: 45 % (ref 42–52)
HEMOGLOBIN: 15.3 GM/DL (ref 13.5–18)
IMMATURE NEUTROPHIL %: 0.2 % (ref 0–0.43)
LYMPHOCYTES ABSOLUTE: 4 K/CU MM
LYMPHOCYTES RELATIVE PERCENT: 39.9 % (ref 24–44)
MCH RBC QN AUTO: 30.8 PG (ref 27–31)
MCHC RBC AUTO-ENTMCNC: 34 % (ref 32–36)
MCV RBC AUTO: 90.5 FL (ref 78–100)
MONOCYTES ABSOLUTE: 0.7 K/CU MM
MONOCYTES RELATIVE PERCENT: 7.1 % (ref 0–4)
PDW BLD-RTO: 12.2 % (ref 11.7–14.9)
PLATELET # BLD: 342 K/CU MM (ref 140–440)
PMV BLD AUTO: 10.3 FL (ref 7.5–11.1)
POTASSIUM SERPL-SCNC: 3.7 MMOL/L (ref 3.5–5.1)
PRO-BNP: 20.72 PG/ML
RBC # BLD: 4.97 M/CU MM (ref 4.6–6.2)
SEGMENTED NEUTROPHILS ABSOLUTE COUNT: 5.3 K/CU MM
SEGMENTED NEUTROPHILS RELATIVE PERCENT: 52.2 % (ref 36–66)
SODIUM BLD-SCNC: 141 MMOL/L (ref 135–145)
TOTAL IMMATURE NEUTOROPHIL: 0.02 K/CU MM
TROPONIN T: <0.01 NG/ML
WBC # BLD: 10.1 K/CU MM (ref 4–10.5)

## 2022-02-08 PROCEDURE — 93005 ELECTROCARDIOGRAM TRACING: CPT | Performed by: EMERGENCY MEDICINE

## 2022-02-08 PROCEDURE — 71045 X-RAY EXAM CHEST 1 VIEW: CPT

## 2022-02-08 PROCEDURE — 83880 ASSAY OF NATRIURETIC PEPTIDE: CPT

## 2022-02-08 PROCEDURE — 80048 BASIC METABOLIC PNL TOTAL CA: CPT

## 2022-02-08 PROCEDURE — 84484 ASSAY OF TROPONIN QUANT: CPT

## 2022-02-08 PROCEDURE — 99284 EMERGENCY DEPT VISIT MOD MDM: CPT

## 2022-02-08 PROCEDURE — 85025 COMPLETE CBC W/AUTO DIFF WBC: CPT

## 2022-02-08 PROCEDURE — 96375 TX/PRO/DX INJ NEW DRUG ADDON: CPT

## 2022-02-08 PROCEDURE — 85379 FIBRIN DEGRADATION QUANT: CPT

## 2022-02-08 PROCEDURE — 96374 THER/PROPH/DIAG INJ IV PUSH: CPT

## 2022-02-08 PROCEDURE — 6370000000 HC RX 637 (ALT 250 FOR IP): Performed by: EMERGENCY MEDICINE

## 2022-02-08 PROCEDURE — 6360000002 HC RX W HCPCS: Performed by: EMERGENCY MEDICINE

## 2022-02-08 RX ORDER — ONDANSETRON 2 MG/ML
4 INJECTION INTRAMUSCULAR; INTRAVENOUS ONCE
Status: COMPLETED | OUTPATIENT
Start: 2022-02-08 | End: 2022-02-08

## 2022-02-08 RX ORDER — PREDNISONE 20 MG/1
60 TABLET ORAL ONCE
Status: COMPLETED | OUTPATIENT
Start: 2022-02-08 | End: 2022-02-08

## 2022-02-08 RX ORDER — METHYLPREDNISOLONE 4 MG/1
TABLET ORAL
Qty: 1 KIT | Refills: 0 | Status: SHIPPED | OUTPATIENT
Start: 2022-02-08

## 2022-02-08 RX ORDER — MORPHINE SULFATE 4 MG/ML
4 INJECTION, SOLUTION INTRAMUSCULAR; INTRAVENOUS ONCE
Status: COMPLETED | OUTPATIENT
Start: 2022-02-08 | End: 2022-02-08

## 2022-02-08 RX ADMIN — PREDNISONE 60 MG: 20 TABLET ORAL at 20:36

## 2022-02-08 RX ADMIN — MORPHINE SULFATE 4 MG: 4 INJECTION INTRAVENOUS at 19:26

## 2022-02-08 RX ADMIN — ONDANSETRON 4 MG: 2 INJECTION INTRAMUSCULAR; INTRAVENOUS at 19:26

## 2022-02-08 ASSESSMENT — ENCOUNTER SYMPTOMS
RESPIRATORY NEGATIVE: 1
SHORTNESS OF BREATH: 0
ORTHOPNEA: 0
EYES NEGATIVE: 1
NAUSEA: 0
TROUBLE SWALLOWING: 0
GASTROINTESTINAL NEGATIVE: 1
VOMITING: 0
COUGH: 0

## 2022-02-08 ASSESSMENT — PAIN SCALES - GENERAL
PAINLEVEL_OUTOF10: 8
PAINLEVEL_OUTOF10: 8

## 2022-02-08 ASSESSMENT — PAIN DESCRIPTION - DESCRIPTORS: DESCRIPTORS: STABBING

## 2022-02-08 ASSESSMENT — HEART SCORE: ECG: 1

## 2022-02-08 ASSESSMENT — PAIN DESCRIPTION - FREQUENCY: FREQUENCY: CONTINUOUS

## 2022-02-08 ASSESSMENT — PAIN DESCRIPTION - ORIENTATION: ORIENTATION: LEFT

## 2022-02-08 ASSESSMENT — PAIN DESCRIPTION - LOCATION: LOCATION: CHEST

## 2022-02-09 LAB
EKG ATRIAL RATE: 87 BPM
EKG DIAGNOSIS: NORMAL
EKG P AXIS: 68 DEGREES
EKG P-R INTERVAL: 146 MS
EKG Q-T INTERVAL: 356 MS
EKG QRS DURATION: 88 MS
EKG QTC CALCULATION (BAZETT): 428 MS
EKG R AXIS: 63 DEGREES
EKG T AXIS: 36 DEGREES
EKG VENTRICULAR RATE: 87 BPM

## 2022-02-09 PROCEDURE — 93010 ELECTROCARDIOGRAM REPORT: CPT | Performed by: INTERNAL MEDICINE

## 2022-02-09 NOTE — ED PROVIDER NOTES
The history is provided by the patient. Chest Pain  Pain location:  L chest and L lateral chest  Pain quality: sharp and stabbing    Pain severity:  Moderate  Onset quality:  Sudden  Timing:  Constant  Progression:  Waxing and waning  Chronicity:  New  Context comment:  Patient was using vape when he started having pain. He was just watching TV. This has happened before but it went away  Relieved by:  Nothing  Worsened by:  Nothing  Ineffective treatments:  None tried  Associated symptoms: no cough, no diaphoresis, no dysphagia, no fatigue, no fever, no headache, no lower extremity edema, no nausea, no near-syncope, no numbness, no orthopnea, no palpitations, no shortness of breath, no vomiting and no weakness        Review of Systems   Constitutional: Negative. Negative for diaphoresis, fatigue and fever. HENT: Negative. Negative for trouble swallowing. Eyes: Negative. Respiratory: Negative. Negative for cough and shortness of breath. Cardiovascular: Positive for chest pain. Negative for palpitations, orthopnea and near-syncope. Gastrointestinal: Negative. Negative for nausea and vomiting. Genitourinary: Negative. Musculoskeletal: Negative. Skin: Negative. Neurological: Negative. Negative for weakness, numbness and headaches. All other systems reviewed and are negative.       Family History   Problem Relation Age of Onset    No Known Problems Mother     No Known Problems Father     No Known Problems Sister     No Known Problems Brother     No Known Problems Maternal Aunt     No Known Problems Maternal Uncle     No Known Problems Paternal Aunt     No Known Problems Paternal Uncle     No Known Problems Maternal Grandmother     No Known Problems Maternal Grandfather     No Known Problems Paternal Grandmother     No Known Problems Paternal Grandfather     No Known Problems Maternal Cousin     No Known Problems Paternal Cousin      Social History     Socioeconomic History  Marital status: Single     Spouse name: Not on file    Number of children: Not on file    Years of education: Not on file    Highest education level: Not on file   Occupational History    Not on file   Tobacco Use    Smoking status: Current Every Day Smoker     Packs/day: 1.00     Types: Cigarettes    Smokeless tobacco: Never Used   Vaping Use    Vaping Use: Every day   Substance and Sexual Activity    Alcohol use: Yes     Comment: rare    Drug use: Not Currently     Types: Marijuana Laurel Emeterio)    Sexual activity: Yes     Partners: Female   Other Topics Concern    Not on file   Social History Narrative    Not on file     Social Determinants of Health     Financial Resource Strain:     Difficulty of Paying Living Expenses: Not on file   Food Insecurity:     Worried About Running Out of Food in the Last Year: Not on file    Tahir of Food in the Last Year: Not on file   Transportation Needs:     Lack of Transportation (Medical): Not on file    Lack of Transportation (Non-Medical):  Not on file   Physical Activity:     Days of Exercise per Week: Not on file    Minutes of Exercise per Session: Not on file   Stress:     Feeling of Stress : Not on file   Social Connections:     Frequency of Communication with Friends and Family: Not on file    Frequency of Social Gatherings with Friends and Family: Not on file    Attends Worship Services: Not on file    Active Member of [address] [place] [address] or Organizations: Not on file    Attends Club or Organization Meetings: Not on file    Marital Status: Not on file   Intimate Partner Violence:     Fear of Current or Ex-Partner: Not on file    Emotionally Abused: Not on file    Physically Abused: Not on file    Sexually Abused: Not on file   Housing Stability:     Unable to Pay for Housing in the Last Year: Not on file    Number of [place] in the Last Year: Not on file    Unstable Housing in the Last Year: Not on file     Past Surgical History:   Procedure Laterality Date    FRACTURE SURGERY       No past medical history on file. Allergies   Allergen Reactions    Codeine      Prior to Admission medications    Medication Sig Start Date End Date Taking? Authorizing Provider   methylPREDNISolone (MEDROL, LUIS,) 4 MG tablet Take by mouth. 2/8/22  Yes Glenelg Bolus Ray, DO       /80   Pulse 80   Temp 97.9 °F (36.6 °C) (Oral)   Resp 25   Wt 145 lb (65.8 kg)   SpO2 99%   BMI 20.81 kg/m²     Physical Exam  Vitals and nursing note reviewed. Constitutional:       Appearance: He is well-developed. HENT:      Head: Normocephalic and atraumatic. Right Ear: External ear normal.      Left Ear: External ear normal.      Nose: Nose normal.   Eyes:      Conjunctiva/sclera: Conjunctivae normal.      Pupils: Pupils are equal, round, and reactive to light. Cardiovascular:      Rate and Rhythm: Normal rate and regular rhythm. Heart sounds: Normal heart sounds. Comments: Chest wall tenderness over pectoralis on the left and left lateral chest wall ribs 3,4 and 5. No sub Q air  Pulmonary:      Effort: Pulmonary effort is normal. No respiratory distress. Breath sounds: Normal breath sounds. No wheezing, rhonchi or rales. Abdominal:      General: Bowel sounds are normal.      Palpations: Abdomen is soft. Tenderness: There is no abdominal tenderness. There is no guarding. Musculoskeletal:         General: Normal range of motion. Cervical back: Normal range of motion and neck supple. Skin:     General: Skin is warm and dry. Neurological:      Mental Status: He is alert and oriented to person, place, and time. GCS: GCS eye subscore is 4. GCS verbal subscore is 5. GCS motor subscore is 6. Psychiatric:         Behavior: Behavior normal.         Thought Content:  Thought content normal.         Judgment: Judgment normal.         MDM:    Labs Reviewed   CBC WITH AUTO DIFFERENTIAL - Abnormal; Notable for the following components: Result Value    Monocytes % 7.1 (*)     All other components within normal limits   BASIC METABOLIC PANEL   BRAIN NATRIURETIC PEPTIDE   TROPONIN   D-DIMER, QUANTITATIVE       XR CHEST PORTABLE   Final Result   No radiographic evidence of acute cardiopulmonary disease. EKG shows no acute pathology       Cardiac score is 3. Patient can be followed outpatient and I have given follow up information. Possible related to pleurisy or reaction from vape use or even smoking marijuana   I have discussed with the patient  my clinical impression and the result of the patient's current clinical evaluation for their presentation. In addition we discussed the risk and benefits of further testing and hospitalization. I discussed candidly with the patient  and the patient  was allowed to provide input as to their thoughts concerning the current presentation. Although the risk of progression or development of new more serious signs and symptoms cannot be excluded He is low risk and can be managed outpatient. Stop smoking and stop using vape/marijuana  My typical dicussion, presentation,and considerations for this patients' chief complaint, diagnosis, and differential diagnosis have been considered. I have stressed need for follow up and reexamination for this encounter. I have discussed my clinical impression and the results of the current evaluation. Patient was  prescribed Medrol dose pack. The medication(s) use,  medication(s) safety and medication(s) interactions with already prescribed medication(s) have been explained and outlined for this encounter. The patient  was educated that it is their responsibility to verify this information is correct at the time of discharge and to contact this department of any complications with the pharmacy providing this medication(s) or if their any difficulty in obtaining this medication(s). Final Impression    1. Pleurisy    2.  Other chest pain              Shelby Chacon [address], [facility],   02/08/22 [id number]

## 2023-04-13 ENCOUNTER — HOSPITAL ENCOUNTER (EMERGENCY)
Age: 33
Discharge: HOME | End: 2023-04-14
Attending: EMERGENCY MEDICINE

## 2023-04-13 DIAGNOSIS — T40.601A OPIATE OVERDOSE, ACCIDENTAL OR UNINTENTIONAL, INITIAL ENCOUNTER (HCC): ICD-10-CM

## 2023-04-13 DIAGNOSIS — R53.1 GENERALIZED WEAKNESS: Primary | ICD-10-CM

## 2023-04-13 PROCEDURE — 99283 EMERGENCY DEPT VISIT LOW MDM: CPT

## 2023-04-13 PROCEDURE — 6360000002 HC RX W HCPCS: Performed by: EMERGENCY MEDICINE

## 2023-04-13 RX ORDER — NALOXONE HYDROCHLORIDE 0.4 MG/ML
0.4 INJECTION, SOLUTION INTRAMUSCULAR; INTRAVENOUS; SUBCUTANEOUS ONCE
Status: COMPLETED | OUTPATIENT
Start: 2023-04-13 | End: 2023-04-13

## 2023-04-13 RX ORDER — NALOXONE HYDROCHLORIDE 1 MG/ML
2 INJECTION INTRAMUSCULAR; INTRAVENOUS; SUBCUTANEOUS ONCE
Status: COMPLETED | OUTPATIENT
Start: 2023-04-13 | End: 2023-04-13

## 2023-04-13 RX ORDER — BUPRENORPHINE HYDROCHLORIDE 8 MG/1
8 TABLET SUBLINGUAL DAILY
Qty: 60 TABLET | Refills: 0 | Status: SHIPPED | OUTPATIENT
Start: 2023-04-13 | End: 2023-05-13

## 2023-04-13 RX ORDER — BUTALBITAL, ACETAMINOPHEN AND CAFFEINE 50; 325; 40 MG/1; MG/1; MG/1
1 TABLET ORAL EVERY 4 HOURS PRN
COMMUNITY

## 2023-04-13 RX ORDER — NALOXONE HYDROCHLORIDE 0.4 MG/ML
INJECTION, SOLUTION INTRAMUSCULAR; INTRAVENOUS; SUBCUTANEOUS
Status: DISCONTINUED
Start: 2023-04-13 | End: 2023-04-14 | Stop reason: HOSPADM

## 2023-04-13 RX ORDER — NALOXONE HYDROCHLORIDE 4 MG/.1ML
1 SPRAY NASAL PRN
Qty: 1 EACH | Refills: 3 | Status: SHIPPED | OUTPATIENT
Start: 2023-04-13

## 2023-04-13 RX ADMIN — NALOXONE HYDROCHLORIDE 0.4 MG: 0.4 INJECTION, SOLUTION INTRAMUSCULAR; INTRAVENOUS; SUBCUTANEOUS at 22:25

## 2023-04-13 RX ADMIN — NALOXONE HYDROCHLORIDE 2 MG: 1 INJECTION PARENTERAL at 23:33

## 2023-04-13 ASSESSMENT — PAIN - FUNCTIONAL ASSESSMENT: PAIN_FUNCTIONAL_ASSESSMENT: NONE - DENIES PAIN

## 2023-04-14 VITALS
HEIGHT: 70 IN | WEIGHT: 165 LBS | HEART RATE: 55 BPM | BODY MASS INDEX: 23.62 KG/M2 | DIASTOLIC BLOOD PRESSURE: 78 MMHG | SYSTOLIC BLOOD PRESSURE: 126 MMHG | OXYGEN SATURATION: 99 % | TEMPERATURE: 98.1 F | RESPIRATION RATE: 11 BRPM

## 2023-04-14 ASSESSMENT — ENCOUNTER SYMPTOMS
ABDOMINAL PAIN: 0
WHEEZING: 0
VOMITING: 0
INGESTION: 1
CONSTIPATION: 0
SORE THROAT: 0
FACIAL SWELLING: 0
GASTROINTESTINAL NEGATIVE: 1
SINUS PAIN: 0
COUGH: 0
BACK PAIN: 0
RHINORRHEA: 0
RESPIRATORY NEGATIVE: 1
SHORTNESS OF BREATH: 0
SINUS PRESSURE: 0
CHEST TIGHTNESS: 0
NAUSEA: 0
EYE REDNESS: 0
EYES NEGATIVE: 1
EYE PAIN: 0
EYE DISCHARGE: 0
DIARRHEA: 0

## 2023-04-14 NOTE — ED PROVIDER NOTES
[facility]      Pt Name: Bony Sheppard  MRN: [id number]  [place] 1990  Date of evaluation: 4/13/2023  Provider: Charissa Alvarez DO    CHIEF COMPLAINT       Chief Complaint   Patient presents with    Fatigue         HISTORY OF PRESENT ILLNESS      Bony Sheppard is a 27 y.o. male who presents to the emergency department  for   Chief Complaint   Patient presents with    Fatigue       The history is provided by the patient, medical records and the spouse. No  was used. Ingestion  Ingested substance:  Illicit drugs  Illicit drug type:  Unable to specify  Witnesses present: no    Called poison control: no    Incident location:  Home  Context: accidental ingestion    Associated symptoms: altered mental status and slurred speech    Associated symptoms: no abdominal pain, no agitation, no chest pain, no cough, no diarrhea, no headaches, no lethargy, no nausea, no shortness of breath and no vomiting    Risk factors: no hx of sucide attempts        Nursing Notes, Triage Notes & Vital Signs were reviewed. REVIEW OF SYSTEMS    (2-9 systems for level 4, 10 or more for level 5)     Review of Systems   Constitutional: Negative. Negative for activity change, chills, fatigue and fever. HENT: Negative. Negative for congestion, dental problem, facial swelling, nosebleeds, postnasal drip, rhinorrhea, sinus pressure, sinus pain and sore throat. Eyes: Negative. Negative for pain, discharge, redness and visual disturbance. Respiratory: Negative. Negative for cough, chest tightness, shortness of breath and wheezing. Cardiovascular: Negative. Negative for chest pain and palpitations. Gastrointestinal: Negative. Negative for abdominal pain, constipation, diarrhea, nausea and vomiting. Genitourinary: Negative. Negative for dysuria, flank pain, frequency, hematuria and urgency. Musculoskeletal: Negative.   Negative for

## 2023-04-14 NOTE — ED NOTES
Says he hasn't taken any percocet for 2 days but has not been acting like he is detoxing. Has been dozing off and acting like he may have taken something recently.      Kenzie Blakely RN  04/13/23 [id number]

## 2023-04-17 ENCOUNTER — HOSPITAL ENCOUNTER (EMERGENCY)
Age: 33
Discharge: HOME | End: 2023-04-17
Attending: EMERGENCY MEDICINE

## 2023-04-17 VITALS
TEMPERATURE: 98 F | DIASTOLIC BLOOD PRESSURE: 80 MMHG | HEART RATE: 61 BPM | RESPIRATION RATE: 16 BRPM | WEIGHT: 165 LBS | SYSTOLIC BLOOD PRESSURE: 167 MMHG | BODY MASS INDEX: 23.68 KG/M2 | OXYGEN SATURATION: 100 %

## 2023-04-17 DIAGNOSIS — F11.93 OPIATE WITHDRAWAL (HCC): Primary | ICD-10-CM

## 2023-04-17 PROCEDURE — 99283 EMERGENCY DEPT VISIT LOW MDM: CPT

## 2023-04-17 PROCEDURE — 6370000000 HC RX 637 (ALT 250 FOR IP): Performed by: EMERGENCY MEDICINE

## 2023-04-17 RX ORDER — BUPRENORPHINE 2 MG/1
8 TABLET SUBLINGUAL ONCE
Status: COMPLETED | OUTPATIENT
Start: 2023-04-17 | End: 2023-04-17

## 2023-04-17 RX ADMIN — BUPRENORPHINE 8 MG: 2 TABLET SUBLINGUAL at 10:28

## 2023-04-17 NOTE — ED PROVIDER NOTES
Triage Chief Complaint:   Withdrawal    [place]:  Rishi Rob is a 27 y.o. male that presents back to the ED and opiate withdrawal.  Patient 27 male was here on Friday came in essentially unresponsive requiring Narcan and he was sent home with a prescription of Narcan as well as Subutex but apparently could not get it filled because of some abnormal ERIC numbers. The patient. He is very anxious he is not diaphoretic he is awake alert he is craving. He has snorted multiple drugs. He is open for rehab I called contact today having [facility] is trying to find a inpatient bed        No past medical history on file.   Past Surgical History:   Procedure Laterality Date    FRACTURE SURGERY       Family History   Problem Relation Age of Onset    No Known Problems Mother     No Known Problems Father     No Known Problems Sister     No Known Problems Brother     No Known Problems Maternal Aunt     No Known Problems Maternal Uncle     No Known Problems Paternal Aunt     No Known Problems Paternal Uncle     No Known Problems Maternal Grandmother     No Known Problems Maternal Grandfather     No Known Problems Paternal Grandmother     No Known Problems Paternal Grandfather     No Known Problems Maternal Cousin     No Known Problems Paternal Cousin      Social History     Socioeconomic History    Marital status: Single     Spouse name: Not on file    Number of children: Not on file    Years of education: Not on file    Highest education level: Not on file   Occupational History    Not on file   Tobacco Use    Smoking status: Every Day     Packs/day: 1.00     Types: Cigarettes    Smokeless tobacco: Never   Vaping Use    Vaping Use: Every day   Substance and Sexual Activity    Alcohol use: Yes     Comment: rare    Drug use: Not Currently     Types: Marijuana Bary Opitz)     Comment: hasn't taken percocet for 2 days    Sexual activity: Yes     Partners: Female   Other Topics Concern    Not on file   Social History Narrative    Not

## 2023-04-17 NOTE — ED NOTES
Pt on call light multiple times stating he is in pain, facility does not want pt to have any more medications, pt aware     Nicolas Mcdaniel, MARY  04/17/23 [id number]

## 2023-04-17 NOTE — ED NOTES
Pt on the phone with rehab facility and states they agree he may get a ride there and he needs to be there by , wife agreed to drive pt.   Informed Dr. Berny Brandt who states he will discharge pt     Makayla Weiss RN  04/17/23 [id number]

## 2023-04-17 NOTE — ED NOTES
Called [facility] and left v/m to verify ETA.  Provided callback number      Jay Mayers  04/17/23 [id number]